# Patient Record
(demographics unavailable — no encounter records)

---

## 2024-12-04 NOTE — HISTORY OF PRESENT ILLNESS
[FreeTextEntry1] : Historical Perspective: 72 year old female with PMHx of anxiety, hypothyroidism, presents for a cardiac evaluation. Two months of intermittent chest pain. Feels like somebody " my heart" for a few seconds. Not exertional. 1-2 episodes per week. No associated symptoms.  There is no history of MI, CVA, CHF, or previous coronary intervention.  Current Health Status: Patient with no chest pain, SOB, or palpitations. No hospitalizations since seeing me last. Remains compliant with medications and reports no adverse effects.

## 2024-12-04 NOTE — PHYSICAL EXAM
[Normal] : moves all extremities, no focal deficits, normal speech [de-identified] :  No carotid bruits auscultated bilaterally.

## 2024-12-04 NOTE — CARDIOLOGY SUMMARY
[de-identified] : 12/4/2024, NSR, normal ECG 6/4/2024, NSR, normal ECG [de-identified] : 6/24/2024, LV EF  60-65%, normal LV diastolic function, trace-mild MR, mild TR with estimated PASP of 28mmHg. [de-identified] : 10/11/2024, CTA Coronary Arteries: mild, non-obstructive disease.